# Patient Record
Sex: FEMALE | Race: WHITE | NOT HISPANIC OR LATINO | Employment: OTHER | URBAN - NONMETROPOLITAN AREA
[De-identification: names, ages, dates, MRNs, and addresses within clinical notes are randomized per-mention and may not be internally consistent; named-entity substitution may affect disease eponyms.]

---

## 2024-11-17 ENCOUNTER — APPOINTMENT (EMERGENCY)
Dept: RADIOLOGY | Facility: HOSPITAL | Age: 74
End: 2024-11-17
Payer: MEDICARE

## 2024-11-17 ENCOUNTER — HOSPITAL ENCOUNTER (EMERGENCY)
Facility: HOSPITAL | Age: 74
Discharge: HOME | End: 2024-11-17
Attending: EMERGENCY MEDICINE | Admitting: EMERGENCY MEDICINE
Payer: MEDICARE

## 2024-11-17 ENCOUNTER — OFFICE VISIT (OUTPATIENT)
Dept: URGENT CARE | Facility: CLINIC | Age: 74
End: 2024-11-17
Payer: MEDICARE

## 2024-11-17 VITALS
RESPIRATION RATE: 17 BRPM | SYSTOLIC BLOOD PRESSURE: 128 MMHG | OXYGEN SATURATION: 98 % | TEMPERATURE: 97.3 F | HEART RATE: 58 BPM | DIASTOLIC BLOOD PRESSURE: 78 MMHG | WEIGHT: 220.9 LBS | BODY MASS INDEX: 36.76 KG/M2

## 2024-11-17 VITALS
BODY MASS INDEX: 35.49 KG/M2 | RESPIRATION RATE: 16 BRPM | SYSTOLIC BLOOD PRESSURE: 124 MMHG | WEIGHT: 213 LBS | DIASTOLIC BLOOD PRESSURE: 64 MMHG | HEIGHT: 65 IN | HEART RATE: 78 BPM | OXYGEN SATURATION: 95 %

## 2024-11-17 DIAGNOSIS — R94.31 ABNORMAL ECG: ICD-10-CM

## 2024-11-17 DIAGNOSIS — N39.0 UTI (URINARY TRACT INFECTION): Primary | ICD-10-CM

## 2024-11-17 DIAGNOSIS — R00.2 PALPITATIONS: ICD-10-CM

## 2024-11-17 DIAGNOSIS — R55 NEAR SYNCOPE: Primary | ICD-10-CM

## 2024-11-17 LAB
ALBUMIN SERPL BCG-MCNC: 4.2 G/DL (ref 3.5–5)
ALP SERPL-CCNC: 49 U/L (ref 34–104)
ALT SERPL W P-5'-P-CCNC: 18 U/L (ref 7–52)
ANION GAP SERPL CALCULATED.3IONS-SCNC: 7 MMOL/L (ref 4–13)
AST SERPL W P-5'-P-CCNC: 18 U/L (ref 13–39)
BACTERIA UR QL AUTO: ABNORMAL /HPF
BASOPHILS # BLD AUTO: 0.1 THOUSANDS/ÂΜL (ref 0–0.1)
BASOPHILS NFR BLD AUTO: 1 % (ref 0–1)
BILIRUB SERPL-MCNC: 0.59 MG/DL (ref 0.2–1)
BILIRUB UR QL STRIP: ABNORMAL
BUN SERPL-MCNC: 21 MG/DL (ref 5–25)
CALCIUM SERPL-MCNC: 9.7 MG/DL (ref 8.4–10.2)
CARDIAC TROPONIN I PNL SERPL HS: 5 NG/L (ref ?–50)
CHLORIDE SERPL-SCNC: 108 MMOL/L (ref 96–108)
CLARITY UR: ABNORMAL
CO2 SERPL-SCNC: 25 MMOL/L (ref 21–32)
COLOR UR: YELLOW
CREAT SERPL-MCNC: 0.84 MG/DL (ref 0.6–1.3)
EOSINOPHIL # BLD AUTO: 0.25 THOUSAND/ÂΜL (ref 0–0.61)
EOSINOPHIL NFR BLD AUTO: 3 % (ref 0–6)
ERYTHROCYTE [DISTWIDTH] IN BLOOD BY AUTOMATED COUNT: 12.6 % (ref 11.6–15.1)
GFR SERPL CREATININE-BSD FRML MDRD: 67 ML/MIN/1.73SQ M
GLUCOSE SERPL-MCNC: 81 MG/DL (ref 65–140)
GLUCOSE UR STRIP-MCNC: NEGATIVE MG/DL
HCT VFR BLD AUTO: 45.2 % (ref 34.8–46.1)
HGB BLD-MCNC: 15.1 G/DL (ref 11.5–15.4)
HGB UR QL STRIP.AUTO: NEGATIVE
HYALINE CASTS #/AREA URNS LPF: ABNORMAL /LPF
IMM GRANULOCYTES # BLD AUTO: 0.04 THOUSAND/UL (ref 0–0.2)
IMM GRANULOCYTES NFR BLD AUTO: 0 % (ref 0–2)
KETONES UR STRIP-MCNC: NEGATIVE MG/DL
LEUKOCYTE ESTERASE UR QL STRIP: ABNORMAL
LYMPHOCYTES # BLD AUTO: 1.8 THOUSANDS/ÂΜL (ref 0.6–4.47)
LYMPHOCYTES NFR BLD AUTO: 20 % (ref 14–44)
MCH RBC QN AUTO: 31.4 PG (ref 26.8–34.3)
MCHC RBC AUTO-ENTMCNC: 33.4 G/DL (ref 31.4–37.4)
MCV RBC AUTO: 94 FL (ref 82–98)
MONOCYTES # BLD AUTO: 0.54 THOUSAND/ÂΜL (ref 0.17–1.22)
MONOCYTES NFR BLD AUTO: 6 % (ref 4–12)
MUCOUS THREADS UR QL AUTO: ABNORMAL
NEUTROPHILS # BLD AUTO: 6.27 THOUSANDS/ÂΜL (ref 1.85–7.62)
NEUTS SEG NFR BLD AUTO: 70 % (ref 43–75)
NITRITE UR QL STRIP: NEGATIVE
NON-SQ EPI CELLS URNS QL MICRO: ABNORMAL /HPF
NRBC BLD AUTO-RTO: 0 /100 WBCS
PH UR STRIP.AUTO: 5.5 [PH]
PLATELET # BLD AUTO: 331 THOUSANDS/UL (ref 149–390)
PMV BLD AUTO: 9.5 FL (ref 8.9–12.7)
POTASSIUM SERPL-SCNC: 4 MMOL/L (ref 3.5–5.3)
PROT SERPL-MCNC: 7.7 G/DL (ref 6.4–8.4)
PROT UR STRIP-MCNC: NEGATIVE MG/DL
RBC # BLD AUTO: 4.81 MILLION/UL (ref 3.81–5.12)
RBC #/AREA URNS AUTO: ABNORMAL /HPF
SODIUM SERPL-SCNC: 140 MMOL/L (ref 135–147)
SP GR UR STRIP.AUTO: >=1.03 (ref 1–1.03)
UROBILINOGEN UR QL STRIP.AUTO: 0.2 E.U./DL
WBC # BLD AUTO: 9 THOUSAND/UL (ref 4.31–10.16)
WBC #/AREA URNS AUTO: ABNORMAL /HPF

## 2024-11-17 PROCEDURE — 99285 EMERGENCY DEPT VISIT HI MDM: CPT

## 2024-11-17 PROCEDURE — 99284 EMERGENCY DEPT VISIT MOD MDM: CPT | Performed by: EMERGENCY MEDICINE

## 2024-11-17 PROCEDURE — 93005 ELECTROCARDIOGRAM TRACING: CPT

## 2024-11-17 PROCEDURE — G0382 LEV 3 HOSP TYPE B ED VISIT: HCPCS

## 2024-11-17 PROCEDURE — 81001 URINALYSIS AUTO W/SCOPE: CPT | Performed by: EMERGENCY MEDICINE

## 2024-11-17 PROCEDURE — 85025 COMPLETE CBC W/AUTO DIFF WBC: CPT | Performed by: EMERGENCY MEDICINE

## 2024-11-17 PROCEDURE — 84484 ASSAY OF TROPONIN QUANT: CPT | Performed by: EMERGENCY MEDICINE

## 2024-11-17 PROCEDURE — 71045 X-RAY EXAM CHEST 1 VIEW: CPT

## 2024-11-17 PROCEDURE — 99283 EMERGENCY DEPT VISIT LOW MDM: CPT

## 2024-11-17 PROCEDURE — 36415 COLL VENOUS BLD VENIPUNCTURE: CPT

## 2024-11-17 PROCEDURE — 80053 COMPREHEN METABOLIC PANEL: CPT | Performed by: EMERGENCY MEDICINE

## 2024-11-17 PROCEDURE — 87086 URINE CULTURE/COLONY COUNT: CPT | Performed by: EMERGENCY MEDICINE

## 2024-11-17 RX ORDER — CEPHALEXIN 500 MG/1
500 CAPSULE ORAL EVERY 12 HOURS SCHEDULED
Qty: 6 CAPSULE | Refills: 0 | Status: SHIPPED | OUTPATIENT
Start: 2024-11-17 | End: 2024-11-20

## 2024-11-17 RX ORDER — METOPROLOL SUCCINATE 25 MG/1
TABLET, EXTENDED RELEASE ORAL
COMMUNITY
Start: 2024-10-22

## 2024-11-17 RX ORDER — ROSUVASTATIN CALCIUM 5 MG/1
5 TABLET, COATED ORAL
COMMUNITY

## 2024-11-17 RX ORDER — HYDROCHLOROTHIAZIDE 12.5 MG/1
12.5 TABLET ORAL DAILY
COMMUNITY
Start: 2024-10-14

## 2024-11-17 RX ORDER — CELECOXIB 200 MG/1
200 CAPSULE ORAL DAILY
COMMUNITY
Start: 2024-10-22

## 2024-11-17 NOTE — PATIENT INSTRUCTIONS
Follow up with PCP in 3-5 days.  Proceed to ER for further evaluation and management of symptoms     If tests have been performed at [facility], our office will contact you with results if changes need to be made to the care plan discussed with you at the visit.  You can review your full results on [facility] MyChart.      Patient Education     Near Fainting   About this topic   Near fainting is when you feel like you may pass out or lose consciousness, but you do not. For a short time, your brain does not get enough blood flow and oxygen to work the right way. Most of the time near fainting is not serious. Your doctor may want to rule out serious causes of fainting.     What are the causes?   A vasovagal response is most often the cause for near fainting, passing out, or feeling dizzy. It happens as a response to many types of triggers. These may include:  Seeing blood or needles  Pain or seeing someone in pain  Emotions like stress or fear  Illness like fevers, low blood sugar, or fluid loss  Standing for too long in one position or getting up too quickly  Straining to have a bowel movement  Drugs that change your blood pressure  What are the main signs?   You may notice some signs before you felt like you might faint. You may have:  Felt lightheaded  Turned pale  Been shaky  Felt hot or sweaty  Been clammy with a cold sweat  Had an upset stomach or nausea  Noticed a change in your eyesight like dim or blurred eyesight, tunnel vision, dilated pupils, or seeing spots in front of the eyes  Noticed hearing loss or ringing in the ears  Yawned  How does the doctor diagnose this health problem?   Your doctor will ask you about your health history. Talk to the doctor about:  All the drugs you are taking. Be sure to include all prescription, over the counter, and herbal supplements. Tell the doctor if you have any drug allergy. Bring a list of drugs you take with you.  What you were doing and how you felt before you felt  faint  Your doctor will do a physical exam and may order:  Lab tests  Electrocardiogram (ECG)  Heart rate and blood pressure while you are lying down, sitting, and standing  Tilt table test  Based on these results, your doctor may order more heart tests like:  Electrophysiology studies to see if a pacemaker or an implantable cardiac defibrillator (ICD) is needed  Heart tests like echocardiogram, stress test, or Holter monitor  Cardiac cath to look for problems with the heart's blood flow  CT or MRI  How does the doctor treat this health problem?   Sometimes the doctor will only want to rule out a more serious problem. You may not need any other care.  Your doctor may want to adjust some of your drugs, especially if you take drugs for high blood pressure.  If your vasovagal response is due to a low heart rate you may need a pacemaker.  What drugs may be needed?   The doctor may order drugs to:  Relieve dizziness  Control blood pressure  What can be done to prevent this health problem?   Find out your triggers. Triggers are things that you notice just before you feel like you will pass out. These may be seeing blood, getting up too quickly, or high temperatures. Try to avoid them.  Drink 6 to 8 glasses of water each day.  Talk to your doctor about how much salt is in your diet. Ask if you need more or less.  If you feel like you might faint, lie down or ease yourself to the floor. Raise your legs. You may want to sit and put your head down between your legs. Both of these may help to avoid falling.  Cross your legs and tighten your leg muscles to help keep your blood pressure from dropping. This might also help before you get a shot or have blood drawn. Remain seated 15 to 30 minutes before rising again to ensure you don't feel faint again.  Get up slowly from a sitting or lying down position. Sit on the edge of the bed and take deep breaths before getting out of bed.  Move your legs often if you need to sit or stand  in one position for a long time.  Do foot exercises. Pump your foot up and down from the ankle or make small circles with your foot.  Wear comfortable stockings that stretch to help with blood flow.  When do I need to call the doctor?   Activate the emergency medical system right away if you have signs of a heart attack or stroke. Call 911 in the [place] or [place]. The sooner treatment begins, the better your chances for recovery. Call for emergency help right away if you have:  Signs of heart attack:  Chest pain  Trouble breathing  Fast heartbeat  Feeling dizzy  Signs of stroke:  Sudden numbness or weakness of the face, arm, or leg, especially on one side of the body  Sudden confusion, trouble speaking or understanding  Sudden trouble seeing in one or both eyes  Sudden trouble walking, dizziness, loss of balance or coordination  Sudden severe headache with no known cause  Call your doctor if you have:  Headache not helped by pain drugs  You hit your head after you faint  You have another near fainting event  Last Reviewed Date   2020-03-27  Consumer Information Use and Disclaimer   This generalized information is a limited summary of diagnosis, treatment, and/or medication information. It is not meant to be comprehensive and should be used as a tool to help the user understand and/or assess potential diagnostic and treatment options. It does NOT include all information about conditions, treatments, medications, side effects, or risks that may apply to a specific patient. It is not intended to be medical advice or a substitute for the medical advice, diagnosis, or treatment of a health care provider based on the health care provider's examination and assessment of a patient’s specific and unique circumstances. Patients must speak with a health care provider for complete information about their health, medical questions, and treatment options, including any risks or benefits regarding use of medications. This  information does not endorse any treatments or medications as safe, effective, or approved for treating a specific patient. [employer] and its affiliates disclaim any warranty or liability relating to this information or the use thereof. The use of this information is governed by the Terms of Use, available at https://www.[employer].com/en/know/clinical-effectiveness-terms   Copyright   Copyright © 2024 [employer] and its affiliates and/or licensors. All rights reserved.

## 2024-11-17 NOTE — PROGRESS NOTES
[facility]        NAME: Kusum Sibley is a 74 y.o. female  : 1950    MRN: [id number]  DATE: 2024  TIME: 10:50 AM    Assessment and Plan   Near syncope [R55]  1. Near syncope  ECG 12 lead    Transfer to other facility      2. Abnormal ECG  Transfer to other facility        EKG showing NSR with 1st degree AV block, HR 68. No prior to compare. Requires higher level of care and recommend she proceed to ED for further evaluation and management of symptoms. Patient agreeable. Coming via private vehicle with . Complete assessment deferred to ED.     Patient Instructions       Patient Instructions   Follow up with PCP in 3-5 days.  Proceed to ER for further evaluation and management of symptoms     If tests have been performed at [facility], our office will contact you with results if changes need to be made to the care plan discussed with you at the visit.  You can review your full results on [facility] MyChart.      Patient Education     Near Fainting   About this topic   Near fainting is when you feel like you may pass out or lose consciousness, but you do not. For a short time, your brain does not get enough blood flow and oxygen to work the right way. Most of the time near fainting is not serious. Your doctor may want to rule out serious causes of fainting.     What are the causes?   A vasovagal response is most often the cause for near fainting, passing out, or feeling dizzy. It happens as a response to many types of triggers. These may include:  Seeing blood or needles  Pain or seeing someone in pain  Emotions like stress or fear  Illness like fevers, low blood sugar, or fluid loss  Standing for too long in one position or getting up too quickly  Straining to have a bowel movement  Drugs that change your blood pressure  What are the main signs?   You may notice some signs before you felt like you might faint. You may have:  Felt lightheaded  Turned pale  Been shaky  Felt hot or  sweaty  Been clammy with a cold sweat  Had an upset stomach or nausea  Noticed a change in your eyesight like dim or blurred eyesight, tunnel vision, dilated pupils, or seeing spots in front of the eyes  Noticed hearing loss or ringing in the ears  Yawned  How does the doctor diagnose this health problem?   Your doctor will ask you about your health history. Talk to the doctor about:  All the drugs you are taking. Be sure to include all prescription, over the counter, and herbal supplements. Tell the doctor if you have any drug allergy. Bring a list of drugs you take with you.  What you were doing and how you felt before you felt faint  Your doctor will do a physical exam and may order:  Lab tests  Electrocardiogram (ECG)  Heart rate and blood pressure while you are lying down, sitting, and standing  Tilt table test  Based on these results, your doctor may order more heart tests like:  Electrophysiology studies to see if a pacemaker or an implantable cardiac defibrillator (ICD) is needed  Heart tests like echocardiogram, stress test, or Holter monitor  Cardiac cath to look for problems with the heart's blood flow  CT or MRI  How does the doctor treat this health problem?   Sometimes the doctor will only want to rule out a more serious problem. You may not need any other care.  Your doctor may want to adjust some of your drugs, especially if you take drugs for high blood pressure.  If your vasovagal response is due to a low heart rate you may need a pacemaker.  What drugs may be needed?   The doctor may order drugs to:  Relieve dizziness  Control blood pressure  What can be done to prevent this health problem?   Find out your triggers. Triggers are things that you notice just before you feel like you will pass out. These may be seeing blood, getting up too quickly, or high temperatures. Try to avoid them.  Drink 6 to 8 glasses of water each day.  Talk to your doctor about how much salt is in your diet. Ask if you  need more or less.  If you feel like you might faint, lie down or ease yourself to the floor. Raise your legs. You may want to sit and put your head down between your legs. Both of these may help to avoid falling.  Cross your legs and tighten your leg muscles to help keep your blood pressure from dropping. This might also help before you get a shot or have blood drawn. Remain seated 15 to 30 minutes before rising again to ensure you don't feel faint again.  Get up slowly from a sitting or lying down position. Sit on the edge of the bed and take deep breaths before getting out of bed.  Move your legs often if you need to sit or  one position for a long time.  Do foot exercises. Pump your foot up and down from the ankle or make small circles with your foot.  Wear comfortable stockings that stretch to help with blood flow.  When do I need to call the doctor?   Activate the emergency medical system right away if you have signs of a heart attack or stroke. Call 911 in the [place] or [place]. The sooner treatment begins, the better your chances for recovery. Call for emergency help right away if you have:  Signs of heart attack:  Chest pain  Trouble breathing  Fast heartbeat  Feeling dizzy  Signs of stroke:  Sudden numbness or weakness of the face, arm, or leg, especially on one side of the body  Sudden confusion, trouble speaking or understanding  Sudden trouble seeing in one or both eyes  Sudden trouble walking, dizziness, loss of balance or coordination  Sudden severe headache with no known cause  Call your doctor if you have:  Headache not helped by pain drugs  You hit your head after you faint  You have another near fainting event  Last Reviewed Date   2020-03-27  Consumer Information Use and Disclaimer   This generalized information is a limited summary of diagnosis, treatment, and/or medication information. It is not meant to be comprehensive and should be used as a tool to help the user understand  and/or assess potential diagnostic and treatment options. It does NOT include all information about conditions, treatments, medications, side effects, or risks that may apply to a specific patient. It is not intended to be medical advice or a substitute for the medical advice, diagnosis, or treatment of a health care provider based on the health care provider's examination and assessment of a patient’s specific and unique circumstances. Patients must speak with a health care provider for complete information about their health, medical questions, and treatment options, including any risks or benefits regarding use of medications. This information does not endorse any treatments or medications as safe, effective, or approved for treating a specific patient. [employer], [employer] and its affiliates disclaim any warranty or liability relating to this information or the use thereof. The use of this information is governed by the Terms of Use, available at https://www.[employer]/en/know/clinical-effectiveness-terms   Copyright   Copyright © 2024 [employer], [employer]. and its affiliates and/or licensors. All rights reserved.        Chief Complaint     Chief Complaint   Patient presents with    Palpitations     While at [employer] this am it felt as if heart was beating really slow, felt weak and presyncopal. Face became flushed and got sweaty. [place] a buzzing noise in ears ( different than normal tinnitus)         History of Present Illness       74-year-old female with a past medical history significant for hypertension and palpitations presents with  for complaints of near syncopal episode.  Patient reports experiencing normal heart palpitations last night around 2300 and so did take a dose of metoprolol as well as Xanax.  States symptoms resolved.  Today while in [employer], experienced near syncopal episode where she felt lightheaded as well as flushed and noticed increased tinnitus and slow heart rate.  States leaning head  on pew in front of her and symptoms resolved after about 10 minutes.  No chest pain or shortness of breath.        Review of Systems   Review of Systems   Constitutional:  Negative for chills and fever.   HENT:  Positive for tinnitus. Negative for congestion, ear discharge, ear pain, rhinorrhea and sore throat.    Eyes:  Negative for discharge.   Respiratory:  Negative for cough, shortness of breath and wheezing.    Cardiovascular:  Positive for palpitations. Negative for chest pain and leg swelling.   Gastrointestinal:  Negative for abdominal pain, diarrhea, nausea and vomiting.   Skin:  Negative for rash.   Neurological:  Positive for light-headedness. Negative for dizziness, seizures, syncope, weakness and numbness.         Current Medications       Current Outpatient Medications:     celecoxib (CeleBREX) 200 mg capsule, Take 200 mg by mouth daily, Disp: , Rfl:     hydroCHLOROthiazide 12.5 mg tablet, Take 12.5 mg by mouth daily, Disp: , Rfl:     metoprolol succinate (TOPROL-XL) 25 mg 24 hr tablet, TAKE 1 AND 1/2 TABLETS BY MOUTH  DAILY AND 1 ADDITIONAL TABLET  DAILY AS NEEDED FOR PALPITATIONS, Disp: , Rfl:     rosuvastatin (CRESTOR) 5 mg tablet, Take 5 mg by mouth, Disp: , Rfl:     Current Allergies     Allergies as of 11/17/2024 - Reviewed 11/17/2024   Allergen Reaction Noted    Fentanyl GI Intolerance 05/14/2019            The following portions of the patient's history were reviewed and updated as appropriate: allergies, current medications, past family history, past medical history, past social history, past surgical history and problem list.     Past Medical History:   Diagnosis Date    High cholesterol     Hypertension     Obesity     Palpitations        Past Surgical History:   Procedure Laterality Date    AUGMENTATION BREAST      BUNIONECTOMY Bilateral     CHOLECYSTECTOMY      JOINT REPLACEMENT Bilateral     hip    REPLACEMENT TOTAL KNEE         Family History   Problem Relation Age of Onset    Heart  "disease Mother     Cancer Father          Medications have been verified.        Objective   /64   Pulse 78   Resp 16   Ht 5' 5\" (1.651 m)   Wt 96.6 kg (213 lb)   SpO2 95%   BMI 35.45 kg/m²   No LMP recorded. Patient is postmenopausal.       Physical Exam     Physical Exam  Vitals and nursing note reviewed.   Constitutional:       General: She is not in acute distress.     Appearance: She is not toxic-appearing.   HENT:      Head: Normocephalic.      Nose: Nose normal.   Eyes:      Conjunctiva/sclera: Conjunctivae normal.   Cardiovascular:      Rate and Rhythm: Normal rate and regular rhythm.      Heart sounds: Normal heart sounds.   Pulmonary:      Effort: Pulmonary effort is normal. No respiratory distress.      Breath sounds: Normal breath sounds. No stridor. No wheezing, rhonchi or rales.   Abdominal:      General: Bowel sounds are normal.      Palpations: Abdomen is soft.      Tenderness: There is no abdominal tenderness.   Skin:     General: Skin is warm and dry.   Neurological:      Mental Status: She is alert and oriented to person, place, and time.      Sensory: Sensation is intact.      Motor: Motor function is intact.      Gait: Gait is intact.   Psychiatric:         Mood and Affect: Mood normal.         Behavior: Behavior normal.                   "

## 2024-11-17 NOTE — ED PROVIDER NOTES
Time reflects when diagnosis was documented in both MDM as applicable and the Disposition within this note       Time User Action Codes Description Comment    11/17/2024  1:25 PM Shubham Rodriguez [N39.0] UTI (urinary tract infection)     11/17/2024  1:25 PM Shubham Rodriguez [R00.2] Palpitations           ED Disposition       ED Disposition   Discharge    Condition   Stable    Date/Time   Sun Nov 17, 2024  1:31 PM    Comment   Kusum Sibley discharge to home/self care.                   Assessment & Plan       Medical Decision Making  Based on the history and medical screening exam performed the patient may be at risk for palpitations, arrhythmia, anxiety, electrolyte abnormality, anemia, dehydration, urinary tract infection, pneumonia, other infection.    Based on the work-up performed in the emergency room which includes physical examination, and which may include laboratory studies and imaging as warranted including advanced imaging such as CT scan or ultrasound, the diagnostic considerations are narrowed to exclude limb or life-threatening process.    The patient is stable for discharge.  Patient with history of palpitations previously.  Workup in the ED negative.  EKG normal.  Lab work negative.  Question of possible urinary tract infection versus contaminated sample.  As patient had episode of palpitation and lightheadedness, and because patient reports frequent urination (although this is unchanged from her apparent baseline), cannot exclude early urinary tract infection.  Will therefore prescribe 3-day course of Keflex.  Otherwise patient has remained stable and is appropriate for discharge at this time    Amount and/or Complexity of Data Reviewed  Labs: ordered. Decision-making details documented in ED Course.     Details: Negative.  UA with early infection versus contaminated sample  Radiology: ordered and independent interpretation performed. Decision-making details documented in ED Course.      Details: Chest x-ray negative  ECG/medicine tests: ordered and independent interpretation performed. Decision-making details documented in ED Course.     Details: Normal sinus rhythm rate 66    Risk  Prescription drug management.             Medications - No data to display    ED Risk Strat Scores                           SBIRT 20yo+      Flowsheet Row Most Recent Value   Initial Alcohol Screen: US AUDIT-C     1. How often do you have a drink containing alcohol? 0 Filed at: 11/17/2024 1136   2. How many drinks containing alcohol do you have on a typical day you are drinking?  0 Filed at: 11/17/2024 1136   3b. FEMALE Any Age, or MALE 65+: How often do you have 4 or more drinks on one occassion? 0 Filed at: 11/17/2024 1136   Audit-C Score 0 Filed at: 11/17/2024 1136   FRANKIE: How many times in the past year have you...    Used an illegal drug or used a prescription medication for non-medical reasons? Never Filed at: 11/17/2024 1136                            History of Present Illness       Chief Complaint   Patient presents with    Palpitations     Pt states that she had heart palpitations last evening, today she woke up and felt more weak including feeling lightheaded in [facility] and had to lay her head down.  Pt denies Cp, SOB.         Past Medical History:   Diagnosis Date    High cholesterol     Hypertension     Obesity     Palpitations       Past Surgical History:   Procedure Laterality Date    AUGMENTATION BREAST      BUNIONECTOMY Bilateral     CHOLECYSTECTOMY      JOINT REPLACEMENT Bilateral     hip    REPLACEMENT TOTAL KNEE        Family History   Problem Relation Age of Onset    Heart disease Mother     Cancer Father       Social History     Tobacco Use    Smoking status: Never     Passive exposure: Never    Smokeless tobacco: Never      E-Cigarette/Vaping      E-Cigarette/Vaping Substances      I have reviewed and agree with the history as documented.     Patient with prior history of palpitations, had  palpitations last night took an extra metoprolol as directed by her physician for when this occurs, went to sleep, this morning went to [facility] where she had episode of dizziness/lightheadedness and sweating.  This has since resolved.  Patient has not had any further palpitation, shortness of breath, chest pain, abdominal pain, nausea or vomiting, dysuria, or other complaint      History provided by:  Patient   used: No    Palpitations  Palpitations quality:  Fast  Onset quality:  Unable to specify  Timing:  Constant  Progression:  Resolved  Chronicity:  New  Relieved by:  Nothing  Worsened by:  Nothing  Ineffective treatments:  None tried  Associated symptoms: diaphoresis and dizziness    Associated symptoms: no back pain, no chest pain, no chest pressure, no cough, no lower extremity edema, no malaise/fatigue, no nausea, no numbness, no orthopnea, no PND, no shortness of breath, no vomiting and no weakness        Review of Systems   Constitutional:  Positive for diaphoresis. Negative for chills, fever and malaise/fatigue.   HENT:  Negative for ear pain, hearing loss, sore throat, trouble swallowing and voice change.    Eyes:  Negative for pain and discharge.   Respiratory:  Negative for cough, shortness of breath and wheezing.    Cardiovascular:  Negative for chest pain, palpitations, orthopnea and PND.   Gastrointestinal:  Negative for abdominal pain, blood in stool, constipation, diarrhea, nausea and vomiting.   Genitourinary:  Negative for dysuria, flank pain, frequency and hematuria.   Musculoskeletal:  Negative for back pain, joint swelling, neck pain and neck stiffness.   Skin:  Negative for rash and wound.   Neurological:  Positive for dizziness. Negative for seizures, syncope, facial asymmetry, weakness, numbness and headaches.   Psychiatric/Behavioral:  Negative for hallucinations, self-injury and suicidal ideas.    All other systems reviewed and are negative.          Objective        ED Triage Vitals   Temperature Pulse Blood Pressure Respirations SpO2 Patient Position - Orthostatic VS   11/17/24 1125 11/17/24 1125 11/17/24 1125 11/17/24 1125 11/17/24 1125 11/17/24 1125   97.5 °F (36.4 °C) 66 (!) 171/80 18 99 % Sitting      Temp Source Heart Rate Source BP Location FiO2 (%) Pain Score    11/17/24 1125 11/17/24 1316 11/17/24 1125 -- 11/17/24 1125    Temporal Monitor Right arm  No Pain      Vitals      Date and Time Temp Pulse SpO2 Resp BP Pain Score FACES Pain Rating User   11/17/24 1316 98.6 °F (37 °C) 72 98 % 17 126/81 No Pain -- KM   11/17/24 1125 97.5 °F (36.4 °C) 66 99 % 18 171/80 No Pain -- ACM            Physical Exam  Vitals and nursing note reviewed.   Constitutional:       General: She is not in acute distress.     Appearance: She is well-developed.   HENT:      Head: Normocephalic and atraumatic.      Right Ear: External ear normal.      Left Ear: External ear normal.   Eyes:      General: No scleral icterus.        Right eye: No discharge.         Left eye: No discharge.      Extraocular Movements: Extraocular movements intact.      Conjunctiva/sclera: Conjunctivae normal.   Cardiovascular:      Rate and Rhythm: Normal rate and regular rhythm.      Heart sounds: Normal heart sounds. No murmur heard.  Pulmonary:      Effort: Pulmonary effort is normal.      Breath sounds: Normal breath sounds. No wheezing or rales.   Abdominal:      General: Bowel sounds are normal. There is no distension.      Palpations: Abdomen is soft.      Tenderness: There is no abdominal tenderness. There is no guarding or rebound.   Musculoskeletal:         General: No deformity. Normal range of motion.      Cervical back: Normal range of motion and neck supple.   Skin:     General: Skin is warm and dry.      Findings: No rash.   Neurological:      General: No focal deficit present.      Mental Status: She is alert and oriented to person, place, and time.      Cranial Nerves: No cranial nerve deficit.    Psychiatric:         Mood and Affect: Mood normal.         Behavior: Behavior normal.         Thought Content: Thought content normal.         Judgment: Judgment normal.         Results Reviewed       Procedure Component Value Units Date/Time    Urine Microscopic [[id number]]  (Abnormal) Collected: 11/17/24 1244    Lab Status: Final result Specimen: Urine, Clean Catch Updated: 11/17/24 1311     RBC, UA 1-2 /hpf      WBC, UA 10-20 /hpf      Epithelial Cells Innumerable /hpf      Bacteria, UA Innumerable /hpf      Hyaline Casts, UA 2-4 /lpf      MUCUS THREADS Innumerable    Urine culture [[id number]] Collected: 11/17/24 1244    Lab Status: In process Specimen: Urine, Clean Catch Updated: 11/17/24 1311    UA w Reflex to Microscopic w Reflex to Culture [[id number]]  (Abnormal) Collected: 11/17/24 1244    Lab Status: Final result Specimen: Urine, Clean Catch Updated: 11/17/24 1258     Color, UA Yellow     Clarity, UA Slightly Cloudy     Specific Gravity, UA >=1.030     pH, UA 5.5     Leukocytes, UA Moderate     Nitrite, UA Negative     Protein, UA Negative mg/dl      Glucose, UA Negative mg/dl      Ketones, UA Negative mg/dl      Urobilinogen, UA 0.2 E.U./dl      Bilirubin, UA Small     Occult Blood, UA Negative    HS Troponin 0hr (reflex protocol) [[id number]]  (Normal) Collected: 11/17/24 1137    Lab Status: Final result Specimen: Blood from Arm, Right Updated: 11/17/24 1216     hs TnI 0hr 5 ng/L     HS Troponin I 2hr [[id number]]     Lab Status: No result Specimen: Blood     HS Troponin I 4hr [[id number]]     Lab Status: No result Specimen: Blood     Comprehensive metabolic panel [[id number]] Collected: 11/17/24 1137    Lab Status: Final result Specimen: Blood from Arm, Right Updated: 11/17/24 1205     Sodium 140 mmol/L      Potassium 4.0 mmol/L      Chloride 108 mmol/L      CO2 25 mmol/L      ANION GAP 7 mmol/L      BUN 21 mg/dL      Creatinine 0.84 mg/dL      Glucose 81 mg/dL      Calcium 9.7 mg/dL      AST 18 U/L       ALT 18 U/L      Alkaline Phosphatase 49 U/L      Total Protein 7.7 g/dL      Albumin 4.2 g/dL      Total Bilirubin 0.59 mg/dL      eGFR 67 ml/min/1.73sq m     Narrative:      [employer] guidelines for Chronic Kidney Disease (CKD):     Stage 1 with normal or high GFR (GFR > 90 mL/min/1.73 square meters)    Stage 2 Mild CKD (GFR = 60-89 mL/min/1.73 square meters)    Stage 3A Moderate CKD (GFR = 45-59 mL/min/1.73 square meters)    Stage 3B Moderate CKD (GFR = 30-44 mL/min/1.73 square meters)    Stage 4 Severe CKD (GFR = 15-29 mL/min/1.73 square meters)    Stage 5 End Stage CKD (GFR <15 mL/min/1.73 square meters)  Note: GFR calculation is accurate only with a steady state creatinine    CBC and differential [[id number]] Collected: 11/17/24 1137    Lab Status: Final result Specimen: Blood from Arm, Right Updated: 11/17/24 1142     WBC 9.00 Thousand/uL      RBC 4.81 Million/uL      Hemoglobin 15.1 g/dL      Hematocrit 45.2 %      MCV 94 fL      MCH 31.4 pg      MCHC 33.4 g/dL      RDW 12.6 %      MPV 9.5 fL      Platelets 331 Thousands/uL      nRBC 0 /100 WBCs      Segmented % 70 %      Immature Grans % 0 %      Lymphocytes % 20 %      Monocytes % 6 %      Eosinophils Relative 3 %      Basophils Relative 1 %      Absolute Neutrophils 6.27 Thousands/µL      Absolute Immature Grans 0.04 Thousand/uL      Absolute Lymphocytes 1.80 Thousands/µL      Absolute Monocytes 0.54 Thousand/µL      Eosinophils Absolute 0.25 Thousand/µL      Basophils Absolute 0.10 Thousands/µL             XR chest 1 view portable   ED Interpretation by Shubham Rodriguez MD (11/17 [id number])   No acute finding          ECG 12 Lead Documentation Only    Date/Time: 11/17/2024 11:29 AM    Performed by: Shubham Rodriguez MD  Authorized by: Shubham Rodriguez MD    ECG reviewed by me, the ED Provider: yes    Patient location:  ED  Previous ECG:     Previous ECG:  Unavailable  Interpretation:     Interpretation: normal    Rate:     ECG rate:   66    ECG rate assessment: normal    Rhythm:     Rhythm: sinus rhythm    Ectopy:     Ectopy: none    QRS:     QRS axis:  Normal    QRS intervals:  Normal  Conduction:     Conduction: normal    ST segments:     ST segments:  Normal  T waves:     T waves: normal        ED Medication and Procedure Management   Prior to Admission Medications   Prescriptions Last Dose Informant Patient Reported? Taking?   celecoxib (CeleBREX) 200 mg capsule   Yes No   Sig: Take 200 mg by mouth daily   hydroCHLOROthiazide 12.5 mg tablet   Yes No   Sig: Take 12.5 mg by mouth daily   metoprolol succinate (TOPROL-XL) 25 mg 24 hr tablet   Yes No   Sig: TAKE 1 AND 1/2 TABLETS BY MOUTH  DAILY AND 1 ADDITIONAL TABLET  DAILY AS NEEDED FOR PALPITATIONS   rosuvastatin (CRESTOR) 5 mg tablet   Yes No   Sig: Take 5 mg by mouth      Facility-Administered Medications: None     Patient's Medications   Discharge Prescriptions    CEPHALEXIN (KEFLEX) 500 MG CAPSULE    Take 1 capsule (500 mg total) by mouth every 12 (twelve) hours for 3 days       Start Date: 11/17/2024End Date: 11/20/2024       Order Dose: 500 mg       Quantity: 6 capsule    Refills: 0     No discharge procedures on file.  ED SEPSIS DOCUMENTATION   Time reflects when diagnosis was documented in both MDM as applicable and the Disposition within this note       Time User Action Codes Description Comment    11/17/2024  1:25 PM Shubham Rodriguez [N39.0] UTI (urinary tract infection)     11/17/2024  1:25 PM Shubham Rodriguez [R00.2] Palpitations                  Shubham Rodriguez MD  11/17/24 3991

## 2024-11-18 LAB
ATRIAL RATE: 66 BPM
ATRIAL RATE: 68 BPM
BACTERIA UR CULT: NORMAL
P AXIS: 15 DEGREES
P AXIS: 8 DEGREES
PR INTERVAL: 202 MS
PR INTERVAL: 212 MS
QRS AXIS: 22 DEGREES
QRS AXIS: 7 DEGREES
QRSD INTERVAL: 86 MS
QRSD INTERVAL: 92 MS
QT INTERVAL: 358 MS
QT INTERVAL: 388 MS
QTC INTERVAL: 375 MS
QTC INTERVAL: 413 MS
T WAVE AXIS: 17 DEGREES
T WAVE AXIS: 8 DEGREES
VENTRICULAR RATE: 66 BPM
VENTRICULAR RATE: 68 BPM

## 2024-11-18 PROCEDURE — 93010 ELECTROCARDIOGRAM REPORT: CPT | Performed by: INTERNAL MEDICINE

## 2025-02-11 ENCOUNTER — HOSPITAL ENCOUNTER (OUTPATIENT)
Dept: CT IMAGING | Facility: HOSPITAL | Age: 75
Discharge: HOME | End: 2025-02-11
Attending: OTOLARYNGOLOGY
Payer: MEDICARE

## 2025-02-11 DIAGNOSIS — D49.0 NEOPLASM OF UNSPECIFIED BEHAVIOR OF DIGESTIVE SYSTEM: ICD-10-CM

## 2025-02-11 PROCEDURE — 70491 CT SOFT TISSUE NECK W/DYE: CPT

## 2025-02-11 RX ADMIN — IOHEXOL 75 ML: 350 INJECTION, SOLUTION INTRAVENOUS at 16:01

## 2025-03-13 ENCOUNTER — TRANSCRIBE ORDERS (OUTPATIENT)
Dept: RADIOLOGY | Facility: HOSPITAL | Age: 75
End: 2025-03-13

## 2025-03-13 DIAGNOSIS — K11.8 PAROTID MASS: Primary | ICD-10-CM

## 2025-04-02 ENCOUNTER — TELEPHONE (OUTPATIENT)
Dept: RADIOLOGY | Facility: HOSPITAL | Age: 75
End: 2025-04-02

## 2025-04-02 NOTE — NURSING NOTE
Call placed to pt to discuss upcoming appointment at [facility] Radiology Department and consultation completed.  Pt is having a R Lymph Node Biopsy completed on 4/4/2025.  Allergies reviewed and verified pt does not currently take any anticoagulant medications.  Pre procedure instructions including diet and taking own medications discussed with pt.  Pt instructed that she may eat normally and take medications as usual before the procedure.  Pt verbalized understanding of instructions given.  Reminded pt of the location, date and time of procedure.  My number was given to call if any questions or concerns arise pre or post procedure.

## 2025-04-04 ENCOUNTER — HOSPITAL ENCOUNTER (OUTPATIENT)
Dept: ULTRASOUND IMAGING | Facility: HOSPITAL | Age: 75
Discharge: HOME | End: 2025-04-04
Payer: MEDICARE

## 2025-04-04 DIAGNOSIS — K11.8 PAROTID MASS: ICD-10-CM

## 2025-04-04 PROCEDURE — 88173 CYTOPATH EVAL FNA REPORT: CPT | Performed by: PATHOLOGY

## 2025-04-04 PROCEDURE — 88305 TISSUE EXAM BY PATHOLOGIST: CPT | Performed by: PATHOLOGY

## 2025-04-04 RX ORDER — LIDOCAINE HYDROCHLORIDE 10 MG/ML
5 INJECTION, SOLUTION EPIDURAL; INFILTRATION; INTRACAUDAL; PERINEURAL ONCE
Status: COMPLETED | OUTPATIENT
Start: 2025-04-04 | End: 2025-04-04

## 2025-04-04 RX ADMIN — LIDOCAINE HYDROCHLORIDE 5 ML: 10 INJECTION, SOLUTION EPIDURAL; INFILTRATION; INTRACAUDAL; PERINEURAL at 10:24
